# Patient Record
Sex: MALE | Race: WHITE
[De-identification: names, ages, dates, MRNs, and addresses within clinical notes are randomized per-mention and may not be internally consistent; named-entity substitution may affect disease eponyms.]

---

## 2017-06-08 ENCOUNTER — HOSPITAL ENCOUNTER (EMERGENCY)
Dept: HOSPITAL 79 - ER1 | Age: 67
LOS: 1 days | Discharge: ANOTHER HOSPITAL | End: 2017-06-09
Payer: MEDICARE

## 2017-06-08 DIAGNOSIS — K64.4: ICD-10-CM

## 2017-06-08 DIAGNOSIS — Z79.899: ICD-10-CM

## 2017-06-08 DIAGNOSIS — Z87.891: ICD-10-CM

## 2017-06-08 DIAGNOSIS — R16.1: ICD-10-CM

## 2017-06-08 DIAGNOSIS — K50.90: Primary | ICD-10-CM

## 2017-06-08 DIAGNOSIS — R33.9: ICD-10-CM

## 2017-06-08 LAB
BUN/CREATININE RATIO: 13 (ref 0–10)
HGB BLD-MCNC: 9.2 GM/DL (ref 14–17.5)
RED BLOOD COUNT: 3.85 M/UL (ref 4.2–5.5)
WHITE BLOOD COUNT: 11.1 K/UL (ref 4.5–11)

## 2018-03-21 ENCOUNTER — OFFICE VISIT (OUTPATIENT)
Dept: UROLOGY | Facility: CLINIC | Age: 68
End: 2018-03-21

## 2018-03-21 VITALS — HEIGHT: 76 IN | WEIGHT: 190 LBS | BODY MASS INDEX: 23.14 KG/M2

## 2018-03-21 DIAGNOSIS — R33.9 RETENTION, URINE: Primary | ICD-10-CM

## 2018-03-21 DIAGNOSIS — R33.8 BENIGN PROSTATIC HYPERPLASIA WITH URINARY RETENTION: ICD-10-CM

## 2018-03-21 DIAGNOSIS — N40.1 BENIGN PROSTATIC HYPERPLASIA WITH URINARY RETENTION: ICD-10-CM

## 2018-03-21 LAB — PSA SERPL-MCNC: 3.79 NG/ML (ref 0–4)

## 2018-03-21 PROCEDURE — 36415 COLL VENOUS BLD VENIPUNCTURE: CPT | Performed by: UROLOGY

## 2018-03-21 PROCEDURE — 84153 ASSAY OF PSA TOTAL: CPT | Performed by: UROLOGY

## 2018-03-21 PROCEDURE — 99214 OFFICE O/P EST MOD 30 MIN: CPT | Performed by: UROLOGY

## 2018-03-21 RX ORDER — TRAZODONE HYDROCHLORIDE 150 MG/1
TABLET ORAL
COMMUNITY
Start: 2017-12-13

## 2018-03-21 RX ORDER — PANTOPRAZOLE SODIUM 40 MG/1
40 TABLET, DELAYED RELEASE ORAL DAILY
Refills: 0 | COMMUNITY
Start: 2018-03-16

## 2018-03-21 RX ORDER — FERROUS SULFATE 325(65) MG
1 TABLET ORAL DAILY
Refills: 0 | COMMUNITY
Start: 2018-02-14

## 2018-03-21 RX ORDER — LEVOTHYROXINE SODIUM 0.1 MG/1
100 TABLET ORAL DAILY
Refills: 0 | COMMUNITY
Start: 2017-12-15

## 2018-03-21 RX ORDER — TAMSULOSIN HYDROCHLORIDE 0.4 MG/1
1 CAPSULE ORAL NIGHTLY
Qty: 30 CAPSULE | Refills: 11 | Status: SHIPPED | OUTPATIENT
Start: 2018-03-21 | End: 2018-12-19 | Stop reason: SDUPTHER

## 2018-03-21 RX ORDER — TRAMADOL HYDROCHLORIDE 50 MG/1
TABLET ORAL
Refills: 0 | COMMUNITY
Start: 2018-03-16

## 2018-03-21 RX ORDER — FINASTERIDE 5 MG/1
5 TABLET, FILM COATED ORAL DAILY
Qty: 30 TABLET | Refills: 11 | Status: SHIPPED | OUTPATIENT
Start: 2018-03-21 | End: 2018-12-19 | Stop reason: SDUPTHER

## 2018-03-21 RX ORDER — CYCLOBENZAPRINE HCL 10 MG
10 TABLET ORAL 3 TIMES DAILY PRN
Refills: 0 | COMMUNITY
Start: 2018-02-14

## 2018-03-21 NOTE — PROGRESS NOTES
Chief Complaint:          Pt has a catheter for 9 months    HPI:   68 y.o. male.    HPI  Pt wants his catheter out.  He still has sores from where his rectum was removed for his crohn's disease he had a AP resection and colostomy at [facility].  Pt used to void every 12 hours prior to his catheter.    Past Medical History:        Past Medical History:   Diagnosis Date   • Thyroid disease          Current Meds:     Current Outpatient Prescriptions   Medication Sig Dispense Refill   • cyclobenzaprine (FLEXERIL) 10 MG tablet Take 10 mg by mouth 3 (Three) Times a Day As Needed.  0   • ferrous sulfate 325 (65 FE) MG tablet Take 1 tablet by mouth Daily. with food  0   • levothyroxine (SYNTHROID, LEVOTHROID) 100 MCG tablet Take 100 mcg by mouth Daily.  0   • pantoprazole (PROTONIX) 40 MG EC tablet Take 40 mg by mouth Daily.  0   • traMADol (ULTRAM) 50 MG tablet   0   • traZODone (DESYREL) 150 MG tablet      • finasteride (PROSCAR) 5 MG tablet Take 1 tablet by mouth Daily. 30 tablet 11   • tamsulosin (FLOMAX) 0.4 MG capsule 24 hr capsule Take 1 capsule by mouth Every Night. 30 capsule 11     No current facility-administered medications for this visit.         Allergies:      No Known Allergies     Past Surgical History:     Past Surgical History:   Procedure Laterality Date   • BACK SURGERY     • COLOSTOMY     • GROIN ABSCESS INCISION AND DRAINAGE     • KNEE SURGERY Right    • RECTAL SURGERY      Rectum removed    • SHOULDER SURGERY Right          Social History:     Social History     Social History   • Marital status: Single     Spouse name: N/A   • Number of children: N/A   • Years of education: N/A     Occupational History   • Not on file.     Social History Main Topics   • Smoking status: Former Smoker   • Smokeless tobacco: Never Used   • Alcohol use No   • Drug use: No   • Sexual activity: Not on file     Other Topics Concern   • Not on file     Social History Narrative   • No narrative on file       Family History:      Family History   Problem Relation Age of Onset   • Hypertension Father    • Pancreatic cancer Mother    • Bone cancer Sister    • Esophagitis Sister    • Skin cancer Sister        Review of Systems:     Review of Systems   Constitutional: Negative for chills, fatigue and fever.   Respiratory: Negative for cough, shortness of breath and wheezing.    Cardiovascular: Negative for leg swelling.   Gastrointestinal: Negative for abdominal pain, nausea and vomiting.   Musculoskeletal: Negative for back pain and joint swelling.   Neurological: Negative for dizziness and headaches.   Psychiatric/Behavioral: Positive for confusion.       The following portions of the patient's history were reviewed and updated as appropriate:allergies, current medications, past family history, past medical history, past social history, past surgical history, problem list and ROS  Physical Exam:     Physical Exam   Constitutional: He is oriented to person, place, and time. He appears well-developed and well-nourished.   HENT:   Head: Normocephalic and atraumatic.   Right Ear: External ear normal.   Left Ear: External ear normal.   Nose: Nose normal.   Mouth/Throat: Oropharynx is clear and moist.   Eyes: Conjunctivae and EOM are normal. Pupils are equal, round, and reactive to light.   Neck: Normal range of motion. Neck supple. No thyromegaly present.   Cardiovascular: Normal rate, regular rhythm, normal heart sounds and intact distal pulses.    No murmur heard.  Pulmonary/Chest: Effort normal and breath sounds normal. No respiratory distress. He has no wheezes. He has no rales. He exhibits no tenderness.   Abdominal: Soft. Bowel sounds are normal. He exhibits no distension and no mass. There is no tenderness. No hernia.   Colostomy in place   Genitourinary: Penis normal.   Genitourinary Comments: Pt has sacral wound from rectal surgery.   Musculoskeletal: Normal range of motion. He exhibits no edema or tenderness.   Lymphadenopathy:     He  "has no cervical adenopathy.   Neurological: He is alert and oriented to person, place, and time. No cranial nerve deficit. He exhibits normal muscle tone. Coordination normal.   Pt has a right leg does not bare weight nor walk well.   Skin: Skin is warm. No rash noted.   Psychiatric: He has a normal mood and affect. His behavior is normal. Judgment and thought content normal.   Nursing note and vitals reviewed.      Ht 193 cm (76\")   Wt 86.2 kg (190 lb)   BMI 23.13 kg/m²    Procedure:         Assessment:     Encounter Diagnoses   Name Primary?   • Retention, urine Yes   • Benign prostatic hyperplasia with urinary retention      Orders Placed This Encounter   Procedures   • PSA DIAGNOSTIC     Standing Status:   Future     Standing Expiration Date:   3/21/2019       Plan:   Will do a voiding trial in 2 weeks.    Counseling was given to patient for the following topics impressions as follows: retention. The interim medical history and current results were reviewed.  A treatment plan with follow-up was made for Retention, urine [R33.9]. I spent 35 minutes face to face with Carter Guerrier and 70 percentage was spent in counseling.    This document has been electronically signed by Juan A Wilson MD March 21, 2018 3:45 PM  "

## 2018-04-04 ENCOUNTER — TELEPHONE (OUTPATIENT)
Dept: UROLOGY | Facility: CLINIC | Age: 68
End: 2018-04-04

## 2018-04-04 NOTE — TELEPHONE ENCOUNTER
Patient scheduled for voiding trial today. Patient unable to urinate since catheter was taken out this morning. Spoke with Dr. Wilson, home health nurse replacing catheter. Rescheduled patient for 3 week voiding trial.

## 2018-04-25 ENCOUNTER — OFFICE VISIT (OUTPATIENT)
Dept: UROLOGY | Facility: CLINIC | Age: 68
End: 2018-04-25

## 2018-04-25 VITALS — WEIGHT: 190 LBS | HEIGHT: 76 IN | BODY MASS INDEX: 23.14 KG/M2

## 2018-04-25 DIAGNOSIS — N31.9 NEUROGENIC BLADDER: ICD-10-CM

## 2018-04-25 DIAGNOSIS — R33.9 RETENTION, URINE: Primary | ICD-10-CM

## 2018-04-25 PROCEDURE — 99214 OFFICE O/P EST MOD 30 MIN: CPT | Performed by: UROLOGY

## 2018-04-25 NOTE — PROGRESS NOTES
Chief Complaint:          Retention    HPI:   68 y.o. male.  Pt failed his voiding trial with 1000 cc in his bladder.  HPI      Past Medical History:        Past Medical History:   Diagnosis Date   • Thyroid disease          Current Meds:     Current Outpatient Prescriptions   Medication Sig Dispense Refill   • cyclobenzaprine (FLEXERIL) 10 MG tablet Take 10 mg by mouth 3 (Three) Times a Day As Needed.  0   • ferrous sulfate 325 (65 FE) MG tablet Take 1 tablet by mouth Daily. with food  0   • finasteride (PROSCAR) 5 MG tablet Take 1 tablet by mouth Daily. 30 tablet 11   • levothyroxine (SYNTHROID, LEVOTHROID) 100 MCG tablet Take 100 mcg by mouth Daily.  0   • pantoprazole (PROTONIX) 40 MG EC tablet Take 40 mg by mouth Daily.  0   • tamsulosin (FLOMAX) 0.4 MG capsule 24 hr capsule Take 1 capsule by mouth Every Night. 30 capsule 11   • traMADol (ULTRAM) 50 MG tablet   0   • traZODone (DESYREL) 150 MG tablet        No current facility-administered medications for this visit.         Allergies:      No Known Allergies     Past Surgical History:     Past Surgical History:   Procedure Laterality Date   • BACK SURGERY     • COLOSTOMY     • GROIN ABSCESS INCISION AND DRAINAGE     • KNEE SURGERY Right    • RECTAL SURGERY      Rectum removed    • SHOULDER SURGERY Right          Social History:     Social History     Social History   • Marital status: Single     Spouse name: N/A   • Number of children: N/A   • Years of education: N/A     Occupational History   • Not on file.     Social History Main Topics   • Smoking status: Former Smoker   • Smokeless tobacco: Never Used   • Alcohol use No   • Drug use: No   • Sexual activity: Not on file     Other Topics Concern   • Not on file     Social History Narrative   • No narrative on file       Family History:     Family History   Problem Relation Age of Onset   • Hypertension Father    • Pancreatic cancer Mother    • Bone cancer Sister    • Esophagitis Sister    • Skin cancer  "Sister        Review of Systems:     Review of Systems    I have reviewed the follow portions of the patient's history and confirmed they are accurate today:  allergies, current medications, past family history, past medical history, past social history, past surgical history, problem list and ROS  Physical Exam:     Physical Exam   Constitutional: He is oriented to person, place, and time.   HENT:   Head: Normocephalic and atraumatic.   Right Ear: External ear normal.   Left Ear: External ear normal.   Nose: Nose normal.   Mouth/Throat: Oropharynx is clear and moist.   Eyes: Conjunctivae and EOM are normal. Pupils are equal, round, and reactive to light.   Neck: Normal range of motion. Neck supple. No thyromegaly present.   Cardiovascular: Normal rate, regular rhythm, normal heart sounds and intact distal pulses.    No murmur heard.  Pulmonary/Chest: Effort normal and breath sounds normal. No respiratory distress. He has no wheezes. He has no rales. He exhibits no tenderness.   Abdominal: Soft. Bowel sounds are normal. He exhibits no distension and no mass. There is no tenderness. No hernia.   Genitourinary: Penis normal.   Musculoskeletal: Normal range of motion. He exhibits no edema or tenderness.   Lymphadenopathy:     He has no cervical adenopathy.   Neurological: He is alert and oriented to person, place, and time. No cranial nerve deficit. He exhibits normal muscle tone. Coordination normal.   Skin: Skin is warm. No rash noted.   Psychiatric: He has a normal mood and affect. His behavior is normal. Judgment and thought content normal.   Nursing note and vitals reviewed.      Ht 193 cm (75.98\")   Wt 86.2 kg (190 lb)   BMI 23.14 kg/m²    Procedure:     16 fr coude hsu.    Assessment:     Encounter Diagnoses   Name Primary?   • Retention, urine Yes   • Neurogenic bladder      No orders of the defined types were placed in this encounter.      Plan:   I discussed how an ap resection can injure the nerves to " the bladder I discussed self cath vs indwelling hsu  Pt is open to self cath.  Will do cysto to define anatomy to see if self cath is reasonable    Patient's Body mass index is 23.14 kg/m². BMI is within normal parameters. No follow-up required.      Counseling was given to patient for the following topics instructions for management as follows: retention. The interim medical history and current results were reviewed.  A treatment plan with follow-up was made for Retention, urine [R33.9].  This document has been electronically signed by Juan A Wilson MD April 25, 2018 3:34 PM

## 2018-05-23 ENCOUNTER — PROCEDURE VISIT (OUTPATIENT)
Dept: UROLOGY | Facility: CLINIC | Age: 68
End: 2018-05-23

## 2018-05-23 VITALS — WEIGHT: 190 LBS | HEIGHT: 76 IN | BODY MASS INDEX: 23.14 KG/M2

## 2018-05-23 DIAGNOSIS — R53.83 FATIGUE, UNSPECIFIED TYPE: Primary | ICD-10-CM

## 2018-05-23 DIAGNOSIS — N21.0 BLADDER STONE: ICD-10-CM

## 2018-05-23 DIAGNOSIS — N31.9 NEUROGENIC BLADDER: ICD-10-CM

## 2018-05-23 PROCEDURE — 52000 CYSTOURETHROSCOPY: CPT | Performed by: UROLOGY

## 2018-05-23 NOTE — PROGRESS NOTES
Chief Complaint:          Retention hypotonic bladder    HPI:   68 y.o. male.  Pt had an AP resection and he has had a catheter for a year and has failed numerous voiding trials  HPI      Past Medical History:        Past Medical History:   Diagnosis Date   • Thyroid disease          Current Meds:     Current Outpatient Prescriptions   Medication Sig Dispense Refill   • cyclobenzaprine (FLEXERIL) 10 MG tablet Take 10 mg by mouth 3 (Three) Times a Day As Needed.  0   • ferrous sulfate 325 (65 FE) MG tablet Take 1 tablet by mouth Daily. with food  0   • finasteride (PROSCAR) 5 MG tablet Take 1 tablet by mouth Daily. 30 tablet 11   • levothyroxine (SYNTHROID, LEVOTHROID) 100 MCG tablet Take 100 mcg by mouth Daily.  0   • pantoprazole (PROTONIX) 40 MG EC tablet Take 40 mg by mouth Daily.  0   • tamsulosin (FLOMAX) 0.4 MG capsule 24 hr capsule Take 1 capsule by mouth Every Night. 30 capsule 11   • traMADol (ULTRAM) 50 MG tablet   0   • traZODone (DESYREL) 150 MG tablet        No current facility-administered medications for this visit.         Allergies:      No Known Allergies     Past Surgical History:     Past Surgical History:   Procedure Laterality Date   • BACK SURGERY     • COLOSTOMY     • GROIN ABSCESS INCISION AND DRAINAGE     • KNEE SURGERY Right    • RECTAL SURGERY      Rectum removed    • SHOULDER SURGERY Right          Social History:     Social History     Social History   • Marital status: Single     Spouse name: N/A   • Number of children: N/A   • Years of education: N/A     Occupational History   • Not on file.     Social History Main Topics   • Smoking status: Former Smoker   • Smokeless tobacco: Never Used   • Alcohol use No   • Drug use: No   • Sexual activity: Not on file     Other Topics Concern   • Not on file     Social History Narrative   • No narrative on file       Family History:     Family History   Problem Relation Age of Onset   • Hypertension Father    • Pancreatic cancer Mother    • Bone  "cancer Sister    • Esophagitis Sister    • Skin cancer Sister        Review of Systems:     Review of Systems   Constitutional: Positive for fatigue.   HENT: Negative for sinus pain.    Eyes: Negative for pain.   Respiratory: Negative for chest tightness.    Cardiovascular: Negative for chest pain.   Gastrointestinal: Negative for abdominal pain.   Endocrine: Negative for heat intolerance.   Genitourinary: Positive for difficulty urinating.   Musculoskeletal: Negative for back pain.   Skin: Negative for rash.   Allergic/Immunologic: Negative for food allergies.   Neurological: Negative for headaches.   Hematological: Does not bruise/bleed easily.   Psychiatric/Behavioral: The patient is not nervous/anxious.        I have reviewed the follow portions of the patient's history and confirmed they are accurate today:  allergies, current medications, past family history, past medical history, past social history, past surgical history, problem list and ROS  Physical Exam:     Physical Exam   Constitutional: He is oriented to person, place, and time. He appears well-developed.   HENT:   Head: Normocephalic.   Eyes: Pupils are equal, round, and reactive to light.   Neck: Normal range of motion.   Cardiovascular: Normal rate.    Pulmonary/Chest: Effort normal and breath sounds normal.   Genitourinary:   Genitourinary Comments: Traumatic hypospadias   Neurological: He is alert and oriented to person, place, and time.   Skin: Skin is warm and dry.       Ht 193 cm (75.98\")   Wt 86.2 kg (190 lb)   BMI 23.14 kg/m²    Procedure:     Procedure: Cystoscopy Male    Indication: retentions    Urinalysis Performed Today:  Negative for Infection    Informed Consent Obtained    Sterile prep performed in usual fashion    11 cc of topical lidocaine inserted urethrally for 10 minutes    Flexible cystoscope inserted and bladder examined    Findings: bladder stone traumatic hypospadius and 1 cm bladder stone. No tumors    Additional " Procedure with Cystoscopy: none      .    Assessment:     Encounter Diagnoses   Name Primary?   • Fatigue, unspecified type Yes   • Neurogenic bladder    • Bladder stone      No orders of the defined types were placed in this encounter.      Plan:   I discussed self cath    Patient's Body mass index is 23.14 kg/m². BMI is within normal parameters. No follow-up required.      Counseling was given to patient for the following topics instructions for management as follows: neurogenic bladder. The interim medical history and current results were reviewed.  A treatment plan with follow-up was made for Fatigue, unspecified type [R53.83]  This document has been electronically signed by Juan A Wilson MD May 31, 2018 7:19 AM

## 2018-06-20 ENCOUNTER — OFFICE VISIT (OUTPATIENT)
Dept: UROLOGY | Facility: CLINIC | Age: 68
End: 2018-06-20

## 2018-06-20 VITALS — WEIGHT: 190 LBS | BODY MASS INDEX: 23.14 KG/M2 | HEIGHT: 76 IN

## 2018-06-20 DIAGNOSIS — N31.9 NEUROGENIC BLADDER: Primary | ICD-10-CM

## 2018-06-20 DIAGNOSIS — R33.9 RETENTION, URINE: ICD-10-CM

## 2018-06-20 PROCEDURE — 99213 OFFICE O/P EST LOW 20 MIN: CPT | Performed by: UROLOGY

## 2018-06-20 NOTE — PROGRESS NOTES
Chief Complaint:          Neurogenic bladder from ap resection.    HPI:   68 y.o. male.  Pt doing self cath 2 to 3 times a day  HPI      Past Medical History:        Past Medical History:   Diagnosis Date   • Thyroid disease          Current Meds:     Current Outpatient Prescriptions   Medication Sig Dispense Refill   • cyclobenzaprine (FLEXERIL) 10 MG tablet Take 10 mg by mouth 3 (Three) Times a Day As Needed.  0   • ferrous sulfate 325 (65 FE) MG tablet Take 1 tablet by mouth Daily. with food  0   • finasteride (PROSCAR) 5 MG tablet Take 1 tablet by mouth Daily. 30 tablet 11   • levothyroxine (SYNTHROID, LEVOTHROID) 100 MCG tablet Take 100 mcg by mouth Daily.  0   • pantoprazole (PROTONIX) 40 MG EC tablet Take 40 mg by mouth Daily.  0   • tamsulosin (FLOMAX) 0.4 MG capsule 24 hr capsule Take 1 capsule by mouth Every Night. 30 capsule 11   • traMADol (ULTRAM) 50 MG tablet   0   • traZODone (DESYREL) 150 MG tablet        No current facility-administered medications for this visit.         Allergies:      No Known Allergies     Past Surgical History:     Past Surgical History:   Procedure Laterality Date   • BACK SURGERY     • COLOSTOMY     • GROIN ABSCESS INCISION AND DRAINAGE     • KNEE SURGERY Right    • RECTAL SURGERY      Rectum removed    • SHOULDER SURGERY Right          Social History:     Social History     Social History   • Marital status: Single     Spouse name: N/A   • Number of children: N/A   • Years of education: N/A     Occupational History   • Not on file.     Social History Main Topics   • Smoking status: Former Smoker   • Smokeless tobacco: Never Used   • Alcohol use No   • Drug use: No   • Sexual activity: Not on file     Other Topics Concern   • Not on file     Social History Narrative   • No narrative on file       Family History:     Family History   Problem Relation Age of Onset   • Hypertension Father    • Pancreatic cancer Mother    • Bone cancer Sister    • Esophagitis Sister    • Skin  cancer Sister        Review of Systems:     Review of Systems   Constitutional: Negative for fatigue.   HENT: Negative for sinus pain.    Eyes: Negative for pain.   Respiratory: Negative for chest tightness.    Cardiovascular: Negative for chest pain.   Gastrointestinal: Negative for abdominal pain.   Endocrine: Negative for heat intolerance.   Genitourinary: Positive for difficulty urinating.   Musculoskeletal: Negative for back pain.   Skin: Negative for rash.   Allergic/Immunologic: Negative for food allergies.   Neurological: Negative for headaches.           I have reviewed the follow portions of the patient's history and confirmed they are accurate today:  allergies, current medications, past family history, past medical history, past social history, past surgical history, problem list and ROS  Physical Exam:     Physical Exam   Constitutional: He is oriented to person, place, and time.   HENT:   Head: Normocephalic and atraumatic.   Right Ear: External ear normal.   Left Ear: External ear normal.   Nose: Nose normal.   Mouth/Throat: Oropharynx is clear and moist.   Eyes: Conjunctivae and EOM are normal. Pupils are equal, round, and reactive to light.   Neck: Normal range of motion. Neck supple. No thyromegaly present.   Cardiovascular: Normal rate, regular rhythm, normal heart sounds and intact distal pulses.    No murmur heard.  Pulmonary/Chest: Effort normal and breath sounds normal. No respiratory distress. He has no wheezes. He has no rales. He exhibits no tenderness.   Abdominal: Soft. Bowel sounds are normal. He exhibits no distension and no mass. There is no tenderness. No hernia.   Genitourinary: Rectum normal, prostate normal and penis normal.   Musculoskeletal: Normal range of motion. He exhibits no edema or tenderness.   Lymphadenopathy:     He has no cervical adenopathy.   Neurological: He is alert and oriented to person, place, and time. No cranial nerve deficit. He exhibits normal muscle tone.  "Coordination normal.   Skin: Skin is warm. No rash noted.   Psychiatric: He has a normal mood and affect. His behavior is normal. Judgment and thought content normal.   Nursing note and vitals reviewed.      Ht 193 cm (75.98\")   Wt 86.2 kg (190 lb)   BMI 23.14 kg/m²    Procedure:         Assessment:     Encounter Diagnoses   Name Primary?   • Neurogenic bladder Yes   • Retention, urine      No orders of the defined types were placed in this encounter.      Plan:   Patient will continue to straight cath 4 times a day and needs to use coude tip due to unable to pass straight catheter, Will see pt back in 6 months    Patient's Body mass index is 23.14 kg/m². BMI is within normal parameters. No follow-up required.      Counseling was given to patient and family for the following topics instructions for management as follows: neurogenic bladder. The interim medical history and current results were reviewed.  A treatment plan with follow-up was made for Neurogenic bladder [N31.9].  This document has been electronically signed by Juan A Wilson MD August 29, 2018 1:01 PM  "

## 2018-07-26 ENCOUNTER — TELEPHONE (OUTPATIENT)
Dept: GASTROENTEROLOGY | Facility: CLINIC | Age: 68
End: 2018-07-26

## 2018-07-26 NOTE — TELEPHONE ENCOUNTER
Patient called stating that he needs a prescription faxed to [facility] Fax number [phone] for Catheters. Thank you.

## 2018-08-29 PROBLEM — R33.9 RETENTION, URINE: Status: ACTIVE | Noted: 2018-08-29

## 2018-08-30 ENCOUNTER — DOCUMENTATION (OUTPATIENT)
Dept: UROLOGY | Facility: CLINIC | Age: 68
End: 2018-08-30

## 2018-12-19 ENCOUNTER — OFFICE VISIT (OUTPATIENT)
Dept: UROLOGY | Facility: CLINIC | Age: 68
End: 2018-12-19

## 2018-12-19 VITALS — WEIGHT: 190 LBS | HEIGHT: 75 IN | BODY MASS INDEX: 23.62 KG/M2

## 2018-12-19 DIAGNOSIS — N40.1 BENIGN PROSTATIC HYPERPLASIA WITH URINARY RETENTION: ICD-10-CM

## 2018-12-19 DIAGNOSIS — N31.9 NEUROGENIC BLADDER: Primary | ICD-10-CM

## 2018-12-19 DIAGNOSIS — R33.8 BENIGN PROSTATIC HYPERPLASIA WITH URINARY RETENTION: ICD-10-CM

## 2018-12-19 DIAGNOSIS — R33.9 RETENTION, URINE: ICD-10-CM

## 2018-12-19 PROCEDURE — 99214 OFFICE O/P EST MOD 30 MIN: CPT | Performed by: UROLOGY

## 2018-12-19 RX ORDER — TAMSULOSIN HYDROCHLORIDE 0.4 MG/1
1 CAPSULE ORAL NIGHTLY
Qty: 30 CAPSULE | Refills: 11 | Status: SHIPPED | OUTPATIENT
Start: 2018-12-19 | End: 2019-12-19

## 2018-12-19 RX ORDER — FINASTERIDE 5 MG/1
5 TABLET, FILM COATED ORAL DAILY
Qty: 30 TABLET | Refills: 11 | Status: SHIPPED | OUTPATIENT
Start: 2018-12-19 | End: 2019-12-19

## 2018-12-19 NOTE — PROGRESS NOTES
Chief Complaint:          Neurogenic bladder after AP resection for Crohn's    HPI:   68 y.o. male.  Pt has been doing self cath for 9 months 3 times a day.He has been self catheter dependent.  His AP resection was 1 1/2 years ago.  HPI      Past Medical History:        Past Medical History:   Diagnosis Date   • Thyroid disease          Current Meds:     Current Outpatient Medications   Medication Sig Dispense Refill   • cyclobenzaprine (FLEXERIL) 10 MG tablet Take 10 mg by mouth 3 (Three) Times a Day As Needed.  0   • ferrous sulfate 325 (65 FE) MG tablet Take 1 tablet by mouth Daily. with food  0   • finasteride (PROSCAR) 5 MG tablet Take 1 tablet by mouth Daily. 30 tablet 11   • levothyroxine (SYNTHROID, LEVOTHROID) 100 MCG tablet Take 100 mcg by mouth Daily.  0   • pantoprazole (PROTONIX) 40 MG EC tablet Take 40 mg by mouth Daily.  0   • tamsulosin (FLOMAX) 0.4 MG capsule 24 hr capsule Take 1 capsule by mouth Every Night. 30 capsule 11   • traMADol (ULTRAM) 50 MG tablet   0   • traZODone (DESYREL) 150 MG tablet        No current facility-administered medications for this visit.         Allergies:      No Known Allergies     Past Surgical History:     Past Surgical History:   Procedure Laterality Date   • BACK SURGERY     • COLOSTOMY     • GROIN ABSCESS INCISION AND DRAINAGE     • KNEE SURGERY Right    • RECTAL SURGERY      Rectum removed    • SHOULDER SURGERY Right          Social History:     Social History     Socioeconomic History   • Marital status: Single     Spouse name: Not on file   • Number of children: Not on file   • Years of education: Not on file   • Highest education level: Not on file   Social Needs   • Financial resource strain: Not on file   • Food insecurity - worry: Not on file   • Food insecurity - inability: Not on file   • Transportation needs - medical: Not on file   • Transportation needs - non-medical: Not on file   Occupational History   • Not on file   Tobacco Use   • Smoking status:  "Former Smoker   • Smokeless tobacco: Never Used   Substance and Sexual Activity   • Alcohol use: No   • Drug use: No   • Sexual activity: Not on file   Other Topics Concern   • Not on file   Social History Narrative   • Not on file       Family History:     Family History   Problem Relation Age of Onset   • Hypertension Father    • Pancreatic cancer Mother    • Bone cancer Sister    • Esophagitis Sister    • Skin cancer Sister        Review of Systems:     Review of Systems   Constitutional: Negative for fatigue.   HENT: Negative for sinus pressure and sinus pain.    Eyes: Negative for itching.   Respiratory: Negative for chest tightness.    Cardiovascular: Negative for chest pain.   Gastrointestinal: Negative for abdominal pain.   Genitourinary: Negative for frequency.   Musculoskeletal: Negative for back pain.   Allergic/Immunologic: Negative for food allergies.   Neurological: Negative for headaches.   Hematological: Does not bruise/bleed easily.   Psychiatric/Behavioral: The patient is not nervous/anxious.              I have reviewed the follow portions of the patient's history and confirmed they are accurate today:  allergies, current medications, past family history, past medical history, past social history, past surgical history, problem list and ROS  Physical Exam:     Physical Exam   Constitutional: He appears well-developed and well-nourished.   HENT:   Head: Normocephalic and atraumatic.   Eyes: EOM are normal. Pupils are equal, round, and reactive to light.   Cardiovascular: Normal rate.   Pulmonary/Chest: Effort normal.   Abdominal: Soft.       Ht 190.5 cm (75\")   Wt 86.2 kg (190 lb)   BMI 23.75 kg/m²    Procedure:         Assessment:     Encounter Diagnoses   Name Primary?   • Neurogenic bladder Yes   • Retention, urine      No orders of the defined types were placed in this encounter.      Plan:   Will keep pt on maximal medical therapy but if he is dependent upon self cath in a year we can stop " them.    Patient's Body mass index is 23.75 kg/m². BMI is within normal parameters. No follow-up required.      Counseling was given to patient and family for the following topics instructions for management as follows: neurogenic bladder. The interim medical history and current results were reviewed.  A treatment plan with follow-up was made for Neurogenic bladder [N31.9].   This document has been electronically signed by Juan A Wilson MD December 19, 2018 3:56 PM

## 2019-05-09 ENCOUNTER — LAB (OUTPATIENT)
Dept: LAB | Facility: HOSPITAL | Age: 69
End: 2019-05-09

## 2019-05-09 ENCOUNTER — TRANSCRIBE ORDERS (OUTPATIENT)
Dept: LAB | Facility: HOSPITAL | Age: 69
End: 2019-05-09

## 2019-05-09 DIAGNOSIS — T84.63XD INFLAMMATORY REACTION DUE TO INTERNAL FIXATION DEVICE OF SPINE, SUBSEQUENT ENCOUNTER: Primary | ICD-10-CM

## 2019-05-09 DIAGNOSIS — L03.319 CELLULITIS OF FLANK: ICD-10-CM

## 2019-05-09 DIAGNOSIS — T84.63XD INFLAMMATORY REACTION DUE TO INTERNAL FIXATION DEVICE OF SPINE, SUBSEQUENT ENCOUNTER: ICD-10-CM

## 2019-05-09 LAB
ALBUMIN SERPL-MCNC: 4.2 G/DL (ref 3.5–5.2)
ALBUMIN/GLOB SERPL: 1.2 G/DL
ALP SERPL-CCNC: 154 U/L (ref 39–117)
ALT SERPL W P-5'-P-CCNC: 27 U/L (ref 1–41)
ANION GAP SERPL CALCULATED.3IONS-SCNC: 17 MMOL/L
AST SERPL-CCNC: 21 U/L (ref 1–40)
BASOPHILS # BLD AUTO: 0.01 10*3/MM3 (ref 0–0.2)
BASOPHILS NFR BLD AUTO: 0.2 % (ref 0–1.5)
BILIRUB SERPL-MCNC: 0.3 MG/DL (ref 0.2–1.2)
BUN BLD-MCNC: 16 MG/DL (ref 8–23)
BUN/CREAT SERPL: 18.6 (ref 7–25)
CALCIUM SPEC-SCNC: 9.7 MG/DL (ref 8.6–10.5)
CHLORIDE SERPL-SCNC: 104 MMOL/L (ref 98–107)
CO2 SERPL-SCNC: 22 MMOL/L (ref 22–29)
CREAT BLD-MCNC: 0.86 MG/DL (ref 0.76–1.27)
CRP SERPL-MCNC: 2.28 MG/DL (ref 0–0.5)
DEPRECATED RDW RBC AUTO: 49.6 FL (ref 37–54)
EOSINOPHIL # BLD AUTO: 0.05 10*3/MM3 (ref 0–0.4)
EOSINOPHIL NFR BLD AUTO: 1 % (ref 0.3–6.2)
ERYTHROCYTE [DISTWIDTH] IN BLOOD BY AUTOMATED COUNT: 15.3 % (ref 12.3–15.4)
ERYTHROCYTE [SEDIMENTATION RATE] IN BLOOD: 52 MM/HR (ref 0–20)
GFR SERPL CREATININE-BSD FRML MDRD: 88 ML/MIN/1.73
GLOBULIN UR ELPH-MCNC: 3.6 GM/DL
GLUCOSE BLD-MCNC: 120 MG/DL (ref 65–99)
HCT VFR BLD AUTO: 41.3 % (ref 37.5–51)
HGB BLD-MCNC: 13.1 G/DL (ref 13–17.7)
IMM GRANULOCYTES # BLD AUTO: 0 10*3/MM3 (ref 0–0.05)
IMM GRANULOCYTES NFR BLD AUTO: 0 % (ref 0–0.5)
LYMPHOCYTES # BLD AUTO: 0.76 10*3/MM3 (ref 0.7–3.1)
LYMPHOCYTES NFR BLD AUTO: 15.6 % (ref 19.6–45.3)
MCH RBC QN AUTO: 27.9 PG (ref 26.6–33)
MCHC RBC AUTO-ENTMCNC: 31.7 G/DL (ref 31.5–35.7)
MCV RBC AUTO: 87.9 FL (ref 79–97)
MONOCYTES # BLD AUTO: 0.35 10*3/MM3 (ref 0.1–0.9)
MONOCYTES NFR BLD AUTO: 7.2 % (ref 5–12)
NEUTROPHILS # BLD AUTO: 3.7 10*3/MM3 (ref 1.7–7)
NEUTROPHILS NFR BLD AUTO: 76 % (ref 42.7–76)
PLATELET # BLD AUTO: 187 10*3/MM3 (ref 140–450)
PMV BLD AUTO: 9.9 FL (ref 6–12)
POTASSIUM BLD-SCNC: 4.1 MMOL/L (ref 3.5–5.2)
PROT SERPL-MCNC: 7.8 G/DL (ref 6–8.5)
RBC # BLD AUTO: 4.7 10*6/MM3 (ref 4.14–5.8)
SODIUM BLD-SCNC: 143 MMOL/L (ref 136–145)
WBC NRBC COR # BLD: 4.87 10*3/MM3 (ref 3.4–10.8)

## 2019-05-09 PROCEDURE — 85652 RBC SED RATE AUTOMATED: CPT

## 2019-05-09 PROCEDURE — 36415 COLL VENOUS BLD VENIPUNCTURE: CPT

## 2019-05-09 PROCEDURE — 80053 COMPREHEN METABOLIC PANEL: CPT

## 2019-05-09 PROCEDURE — 85025 COMPLETE CBC W/AUTO DIFF WBC: CPT

## 2019-05-09 PROCEDURE — 86140 C-REACTIVE PROTEIN: CPT

## 2019-05-23 ENCOUNTER — LAB (OUTPATIENT)
Dept: LAB | Facility: HOSPITAL | Age: 69
End: 2019-05-23

## 2019-05-23 ENCOUNTER — TRANSCRIBE ORDERS (OUTPATIENT)
Dept: LAB | Facility: HOSPITAL | Age: 69
End: 2019-05-23

## 2019-05-23 DIAGNOSIS — T84.63XD INFLAMMATORY REACTION DUE TO INTERNAL FIXATION DEVICE OF SPINE, SUBSEQUENT ENCOUNTER: Primary | ICD-10-CM

## 2019-05-23 DIAGNOSIS — T84.63XD INFLAMMATORY REACTION DUE TO INTERNAL FIXATION DEVICE OF SPINE, SUBSEQUENT ENCOUNTER: ICD-10-CM

## 2019-05-23 LAB
ALBUMIN SERPL-MCNC: 4.4 G/DL (ref 3.5–5.2)
ALBUMIN/GLOB SERPL: 1.3 G/DL
ALP SERPL-CCNC: 145 U/L (ref 39–117)
ALT SERPL W P-5'-P-CCNC: 30 U/L (ref 1–41)
ANION GAP SERPL CALCULATED.3IONS-SCNC: 10 MMOL/L
AST SERPL-CCNC: 21 U/L (ref 1–40)
BILIRUB SERPL-MCNC: 0.3 MG/DL (ref 0.2–1.2)
BUN BLD-MCNC: 22 MG/DL (ref 8–23)
BUN/CREAT SERPL: 23.4 (ref 7–25)
CALCIUM SPEC-SCNC: 9.5 MG/DL (ref 8.6–10.5)
CHLORIDE SERPL-SCNC: 105 MMOL/L (ref 98–107)
CO2 SERPL-SCNC: 25 MMOL/L (ref 22–29)
CREAT BLD-MCNC: 0.94 MG/DL (ref 0.76–1.27)
CRP SERPL-MCNC: 1.04 MG/DL (ref 0–0.5)
DEPRECATED RDW RBC AUTO: 48.9 FL (ref 37–54)
ERYTHROCYTE [DISTWIDTH] IN BLOOD BY AUTOMATED COUNT: 15.1 % (ref 12.3–15.4)
ERYTHROCYTE [SEDIMENTATION RATE] IN BLOOD: 33 MM/HR (ref 0–20)
GFR SERPL CREATININE-BSD FRML MDRD: 80 ML/MIN/1.73
GLOBULIN UR ELPH-MCNC: 3.3 GM/DL
GLUCOSE BLD-MCNC: 113 MG/DL (ref 65–99)
HCT VFR BLD AUTO: 41.8 % (ref 37.5–51)
HGB BLD-MCNC: 13.3 G/DL (ref 13–17.7)
MCH RBC QN AUTO: 28.2 PG (ref 26.6–33)
MCHC RBC AUTO-ENTMCNC: 31.8 G/DL (ref 31.5–35.7)
MCV RBC AUTO: 88.6 FL (ref 79–97)
PLATELET # BLD AUTO: 134 10*3/MM3 (ref 140–450)
PMV BLD AUTO: 10.5 FL (ref 6–12)
POTASSIUM BLD-SCNC: 4.3 MMOL/L (ref 3.5–5.2)
PROT SERPL-MCNC: 7.7 G/DL (ref 6–8.5)
RBC # BLD AUTO: 4.72 10*6/MM3 (ref 4.14–5.8)
SODIUM BLD-SCNC: 140 MMOL/L (ref 136–145)
WBC NRBC COR # BLD: 4.95 10*3/MM3 (ref 3.4–10.8)

## 2019-05-23 PROCEDURE — 85652 RBC SED RATE AUTOMATED: CPT

## 2019-05-23 PROCEDURE — 85027 COMPLETE CBC AUTOMATED: CPT

## 2019-05-23 PROCEDURE — 36415 COLL VENOUS BLD VENIPUNCTURE: CPT | Performed by: INTERNAL MEDICINE

## 2019-05-23 PROCEDURE — 80053 COMPREHEN METABOLIC PANEL: CPT

## 2019-05-23 PROCEDURE — 86140 C-REACTIVE PROTEIN: CPT | Performed by: INTERNAL MEDICINE

## 2019-12-19 ENCOUNTER — OFFICE VISIT (OUTPATIENT)
Dept: UROLOGY | Facility: CLINIC | Age: 69
End: 2019-12-19

## 2019-12-19 VITALS — HEIGHT: 75 IN | WEIGHT: 232 LBS | BODY MASS INDEX: 28.85 KG/M2

## 2019-12-19 DIAGNOSIS — N31.9 NEUROGENIC BLADDER: Primary | ICD-10-CM

## 2019-12-19 DIAGNOSIS — N42.9 DISORDER OF PROSTATE: ICD-10-CM

## 2019-12-19 PROCEDURE — 99213 OFFICE O/P EST LOW 20 MIN: CPT | Performed by: UROLOGY

## 2019-12-19 PROCEDURE — 84153 ASSAY OF PSA TOTAL: CPT | Performed by: UROLOGY

## 2019-12-19 NOTE — PROGRESS NOTES
Chief Complaint:          NEUROGENIC BLADDER    HPI:   69 y.o. male.  Pt has some leakage when he get when he gets up.  Pt has crohn's and he has a colostomy.  He does not have a rectum.  He is a paraplegic.  HPI  He caths 4 to 6 times a day for 300 to 600 cc.  He has not had many infections.    Past Medical History:        Past Medical History:   Diagnosis Date   • Thyroid disease          Current Meds:     Current Outpatient Medications   Medication Sig Dispense Refill   • cyclobenzaprine (FLEXERIL) 10 MG tablet Take 10 mg by mouth 3 (Three) Times a Day As Needed.  0   • ferrous sulfate 325 (65 FE) MG tablet Take 1 tablet by mouth Daily. with food  0   • levothyroxine (SYNTHROID, LEVOTHROID) 100 MCG tablet Take 100 mcg by mouth Daily.  0   • pantoprazole (PROTONIX) 40 MG EC tablet Take 40 mg by mouth Daily.  0   • traMADol (ULTRAM) 50 MG tablet   0   • traZODone (DESYREL) 150 MG tablet        No current facility-administered medications for this visit.         Allergies:      No Known Allergies     Past Surgical History:     Past Surgical History:   Procedure Laterality Date   • BACK SURGERY     • COLOSTOMY     • GROIN ABSCESS INCISION AND DRAINAGE     • KNEE SURGERY Right    • RECTAL SURGERY      Rectum removed    • SHOULDER SURGERY Right          Social History:     Social History     Socioeconomic History   • Marital status: Single     Spouse name: Not on file   • Number of children: Not on file   • Years of education: Not on file   • Highest education level: Not on file   Tobacco Use   • Smoking status: Former Smoker   • Smokeless tobacco: Never Used   Substance and Sexual Activity   • Alcohol use: No   • Drug use: No   • Sexual activity: Defer       Family History:     Family History   Problem Relation Age of Onset   • Hypertension Father    • Pancreatic cancer Mother    • Bone cancer Sister    • Esophagitis Sister    • Skin cancer Sister        Review of Systems:     Review of Systems   Constitutional:  "Negative for chills, fatigue and fever.   HENT: Negative for congestion and sinus pressure.    Respiratory: Positive for chest tightness. Negative for shortness of breath.    Cardiovascular: Negative for chest pain.   Gastrointestinal: Negative for abdominal pain, constipation, diarrhea, nausea and vomiting.   Genitourinary: Negative for flank pain, frequency, hematuria and urgency.   Musculoskeletal: Negative for back pain and neck pain.   Skin: Negative for rash.   Neurological: Negative for dizziness and headaches.   Hematological: Does not bruise/bleed easily.   Psychiatric/Behavioral: The patient is not nervous/anxious.          Physical Exam:     Physical Exam   Constitutional: He is oriented to person, place, and time.   HENT:   Head: Normocephalic and atraumatic.   Right Ear: External ear normal.   Left Ear: External ear normal.   Nose: Nose normal.   Mouth/Throat: Oropharynx is clear and moist.   Eyes: Pupils are equal, round, and reactive to light. Conjunctivae and EOM are normal.   Neck: Normal range of motion. Neck supple. No thyromegaly present.   Cardiovascular: Normal rate, regular rhythm, normal heart sounds and intact distal pulses.   No murmur heard.  Pulmonary/Chest: Effort normal and breath sounds normal. No respiratory distress. He has no wheezes. He has no rales. He exhibits no tenderness.   Abdominal: Soft. Bowel sounds are normal. He exhibits no distension and no mass. There is no tenderness. No hernia.   colostomy   Musculoskeletal: He exhibits no edema or tenderness.   Lymphadenopathy:     He has no cervical adenopathy.   Neurological: He is oriented to person, place, and time. No cranial nerve deficit. He exhibits normal muscle tone. Coordination normal.   Skin: No rash noted.   Nursing note and vitals reviewed.      Ht 190.5 cm (75\")   Wt 105 kg (232 lb)   BMI 29.00 kg/m²    Procedure:         Assessment:     Encounter Diagnoses   Name Primary?   • Neurogenic bladder Yes   • Disorder of " prostate        Orders Placed This Encounter   Procedures   • PSA DIAGNOSTIC     Standing Status:   Future     Number of Occurrences:   1     Standing Expiration Date:   12/19/2022       Patient reports that he is not currently experiencing any symptoms of urinary incontinence.      Plan:   Will check a psa today and will see him next year.    Patient's Body mass index is 29 kg/m². BMI is within normal parameters. No follow-up required..      Smoking Cessation Counseling:  Former smoker.  Patient does not currently use any tobacco products.   Quit 2 years ago    Counseling was given to patient for the following topics instructions for management as follows: neurogenic bladder. The interim medical history and current results were reviewed.  A treatment plan with follow-up was made for Neurogenic bladder [N31.9]     This document has been electronically signed by Juan A Wilson MD December 29, 2019 10:17 AM

## 2019-12-20 LAB — PSA SERPL-MCNC: 1.36 NG/ML (ref 0–4)

## 2020-11-05 ENCOUNTER — OFFICE VISIT (OUTPATIENT)
Dept: UROLOGY | Facility: CLINIC | Age: 70
End: 2020-11-05

## 2020-11-05 VITALS — WEIGHT: 235 LBS | HEIGHT: 76 IN | BODY MASS INDEX: 28.62 KG/M2 | TEMPERATURE: 98.9 F

## 2020-11-05 DIAGNOSIS — R97.20 ELEVATED PSA: ICD-10-CM

## 2020-11-05 DIAGNOSIS — N31.9 NEUROGENIC BLADDER: Primary | ICD-10-CM

## 2020-11-05 DIAGNOSIS — R33.9 RETENTION, URINE: ICD-10-CM

## 2020-11-05 PROCEDURE — 36415 COLL VENOUS BLD VENIPUNCTURE: CPT | Performed by: UROLOGY

## 2020-11-05 PROCEDURE — 99213 OFFICE O/P EST LOW 20 MIN: CPT | Performed by: UROLOGY

## 2020-11-05 PROCEDURE — 84153 ASSAY OF PSA TOTAL: CPT | Performed by: UROLOGY

## 2020-11-05 PROCEDURE — 84154 ASSAY OF PSA FREE: CPT | Performed by: UROLOGY

## 2020-11-05 NOTE — PROGRESS NOTES
Chief Complaint:          Elevated PSA    HPI:   70 y.o. male.  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  He has been doing self cath for 3 years.  His psa was 6.2.  Pt has had his rectum removed and he has a colostomy.  He had his rectum removed for chron's and he became a paraplegic 3 years ago.  His psa 2 years ago was 3.79 and now it is 6.2.  HPI      Past Medical History:        Past Medical History:   Diagnosis Date   • Thyroid disease          Current Meds:     Current Outpatient Medications   Medication Sig Dispense Refill   • cyclobenzaprine (FLEXERIL) 10 MG tablet Take 10 mg by mouth 3 (Three) Times a Day As Needed.  0   • ferrous sulfate 325 (65 FE) MG tablet Take 1 tablet by mouth Daily. with food  0   • levothyroxine (SYNTHROID, LEVOTHROID) 100 MCG tablet Take 100 mcg by mouth Daily.  0   • pantoprazole (PROTONIX) 40 MG EC tablet Take 40 mg by mouth Daily.  0   • traMADol (ULTRAM) 50 MG tablet   0   • traZODone (DESYREL) 150 MG tablet        No current facility-administered medications for this visit.         Allergies:      No Known Allergies     Past Surgical History:     Past Surgical History:   Procedure Laterality Date   • BACK SURGERY     • COLOSTOMY     • GROIN ABSCESS INCISION AND DRAINAGE     • KNEE SURGERY Right    • RECTAL SURGERY      Rectum removed    • SHOULDER SURGERY Right          Social History:     Social History     Socioeconomic History   • Marital status: Single     Spouse name: Not on file   • Number of children: Not on file   • Years of education: Not on file   • Highest education level: Not on file   Tobacco Use   • Smoking status: Former Smoker   • Smokeless tobacco: Never Used   Substance and Sexual Activity   • Alcohol use: No   • Drug use: No   • Sexual activity: Defer       Family History:     Family History   Problem Relation Age of Onset   • Hypertension Father    • Pancreatic cancer Mother    • Bone cancer Sister    •  "Esophagitis Sister    • Skin cancer Sister        Review of Systems:     Review of Systems          I have reviewed the follow portions of the patient's history and confirmed they are accurate today:  allergies, current medications, past family history, past medical history, past social history, past surgical history, problem list and ROS  Physical Exam:     Physical Exam  Vitals signs and nursing note reviewed.   HENT:      Head: Normocephalic and atraumatic.      Right Ear: External ear normal.      Left Ear: External ear normal.      Nose: Nose normal.   Eyes:      Conjunctiva/sclera: Conjunctivae normal.      Pupils: Pupils are equal, round, and reactive to light.   Neck:      Musculoskeletal: Normal range of motion and neck supple.      Thyroid: No thyromegaly.   Cardiovascular:      Rate and Rhythm: Normal rate and regular rhythm.      Heart sounds: Normal heart sounds. No murmur.   Pulmonary:      Effort: Pulmonary effort is normal. No respiratory distress.      Breath sounds: Normal breath sounds. No wheezing or rales.   Chest:      Chest wall: No tenderness.   Abdominal:      General: Bowel sounds are normal. There is no distension.      Palpations: Abdomen is soft. There is no mass.      Tenderness: There is no abdominal tenderness.      Hernia: No hernia is present.   Musculoskeletal:         General: No tenderness.   Lymphadenopathy:      Cervical: No cervical adenopathy.   Skin:     General: Skin is warm.      Findings: No rash.   Neurological:      Mental Status: He is alert and oriented to person, place, and time.      Cranial Nerves: No cranial nerve deficit.      Motor: No abnormal muscle tone.      Coordination: Coordination normal.   Psychiatric:         Behavior: Behavior normal.         Thought Content: Thought content normal.         Judgment: Judgment normal.         Temp 98.9 °F (37.2 °C)   Ht 193 cm (76\")   Wt 107 kg (235 lb)   BMI 28.61 kg/m²    Procedure:         Assessment: "     Encounter Diagnoses   Name Primary?   • Neurogenic bladder Yes   • Retention, urine    • Elevated PSA        Orders Placed This Encounter   Procedures   • PSA, Total & Free     Standing Status:   Future     Standing Expiration Date:   11/5/2021       Patient reports that he is not currently experiencing any symptoms of urinary incontinence.      Plan:   Will check a free and total psa today and will see him back in a month  Pt's total psa is 6.5 and his free psa is 13%.  Will refer pt to Dr. Catherine for evaluation and diagnosis    Patient's Body mass index is 28.61 kg/m². BMI is within normal parameters. No follow-up required..      Smoking Cessation Counseling:  Former smoker.  Patient does not currently use any tobacco products.     Counseling was given to patient and family for the following topics impressions as follows: elevated psa. The interim medical history and current results were reviewed.  A treatment plan with follow-up was made for Neurogenic bladder [N31.9].     This document has been electronically signed by Juan A Wilson MD November 5, 2020 14:06 EST

## 2020-11-07 LAB
PSA FREE MFR SERPL: 13.4 %
PSA FREE SERPL-MCNC: 0.87 NG/ML
PSA SERPL-MCNC: 6.5 NG/ML (ref 0–4)

## 2020-11-09 ENCOUNTER — TELEPHONE (OUTPATIENT)
Dept: UROLOGY | Facility: CLINIC | Age: 70
End: 2020-11-09

## 2020-11-09 NOTE — TELEPHONE ENCOUNTER
Called patient to let him know that his PSA was a 6.5 and that Dr putnam is referring him to Dr lucio moss to be treated. PAtient verbalized understanding.

## 2020-12-08 ENCOUNTER — OFFICE VISIT (OUTPATIENT)
Dept: UROLOGY | Facility: CLINIC | Age: 70
End: 2020-12-08

## 2020-12-08 VITALS — TEMPERATURE: 97.9 F | WEIGHT: 235 LBS | HEIGHT: 76 IN | BODY MASS INDEX: 28.62 KG/M2

## 2020-12-08 DIAGNOSIS — R97.20 ELEVATED PSA: Primary | ICD-10-CM

## 2020-12-08 PROCEDURE — 99213 OFFICE O/P EST LOW 20 MIN: CPT | Performed by: UROLOGY

## 2020-12-08 NOTE — PROGRESS NOTES
Chief Complaint:          Elevated psa     HPI:   70 y.o. male.  His psa is elevated at 6.5 and his free psa is 13%.  HPI  Pt has an appointment with Dr. Trevizo on 1/26 for an opinion.regarding    Past Medical History:        Past Medical History:   Diagnosis Date   • Thyroid disease          Current Meds:     Current Outpatient Medications   Medication Sig Dispense Refill   • cyclobenzaprine (FLEXERIL) 10 MG tablet Take 10 mg by mouth 3 (Three) Times a Day As Needed.  0   • ferrous sulfate 325 (65 FE) MG tablet Take 1 tablet by mouth Daily. with food  0   • levothyroxine (SYNTHROID, LEVOTHROID) 100 MCG tablet Take 100 mcg by mouth Daily.  0   • pantoprazole (PROTONIX) 40 MG EC tablet Take 40 mg by mouth Daily.  0   • traMADol (ULTRAM) 50 MG tablet   0   • traZODone (DESYREL) 150 MG tablet        No current facility-administered medications for this visit.         Allergies:      No Known Allergies     Past Surgical History:     Past Surgical History:   Procedure Laterality Date   • BACK SURGERY     • COLOSTOMY     • GROIN ABSCESS INCISION AND DRAINAGE     • KNEE SURGERY Right    • RECTAL SURGERY      Rectum removed    • SHOULDER SURGERY Right          Social History:     Social History     Socioeconomic History   • Marital status: Single     Spouse name: Not on file   • Number of children: Not on file   • Years of education: Not on file   • Highest education level: Not on file   Tobacco Use   • Smoking status: Former Smoker   • Smokeless tobacco: Never Used   Substance and Sexual Activity   • Alcohol use: No   • Drug use: No   • Sexual activity: Defer       Family History:     Family History   Problem Relation Age of Onset   • Hypertension Father    • Pancreatic cancer Mother    • Bone cancer Sister    • Esophagitis Sister    • Skin cancer Sister        Review of Systems:     Review of Systems   Constitutional: Negative for chills, fatigue and fever.   HENT: Negative for congestion and sinus pressure.   "  Respiratory: Negative for chest tightness and shortness of breath.    Cardiovascular: Negative for chest pain.   Gastrointestinal: Negative for abdominal pain, constipation, diarrhea, nausea and vomiting.   Genitourinary: Negative for flank pain, frequency, hematuria and urgency.   Musculoskeletal: Negative for back pain and neck pain.   Skin: Negative for rash.   Neurological: Negative for dizziness and headaches.   Hematological: Bruises/bleeds easily.   Psychiatric/Behavioral: The patient is not nervous/anxious.      Physical Exam:     Physical Exam    Temp 97.9 °F (36.6 °C)   Ht 193 cm (76\")   Wt 107 kg (235 lb)   BMI 28.61 kg/m²    Procedure:         Assessment:     Encounter Diagnosis   Name Primary?   • Elevated PSA Yes       No orders of the defined types were placed in this encounter.      Patient reports that he is not currently experiencing any symptoms of urinary incontinence.      Plan:   Because of his not having a rectum from chron's and his paraplegia from osteomyelitis from chron's diagnosing prostate cancer will be difficult and treatment may be even more difficult.     Patient's Body mass index is 28.61 kg/m². BMI is within normal parameters. No follow-up required..      Smoking Cessation Counseling:  Never a smoker.  Patient does not currently use any tobacco products.   Counseling was given to patient for the following topics diagnostic results including: psa and free psa. The interim medical history and current results were reviewed.  A treatment plan with follow-up was made for Elevated PSA [R97.20]. I spent 23 minutes face to face with Carter Guerrier and 80 percentage was spent in counseling.       This document has been electronically signed by Juan A Wilson MD December 8, 2020 15:03 EST      "

## 2021-05-07 ENCOUNTER — OFFICE VISIT (OUTPATIENT)
Dept: UROLOGY | Facility: CLINIC | Age: 71
End: 2021-05-07

## 2021-05-07 VITALS — BODY MASS INDEX: 28.73 KG/M2 | TEMPERATURE: 98.1 F | HEIGHT: 76 IN | WEIGHT: 235.89 LBS

## 2021-05-07 DIAGNOSIS — R33.9 RETENTION, URINE: ICD-10-CM

## 2021-05-07 DIAGNOSIS — R97.20 ELEVATED PSA: Primary | ICD-10-CM

## 2021-05-07 DIAGNOSIS — N31.9 NEUROGENIC BLADDER: ICD-10-CM

## 2021-05-07 PROCEDURE — 84153 ASSAY OF PSA TOTAL: CPT | Performed by: UROLOGY

## 2021-05-07 PROCEDURE — 99214 OFFICE O/P EST MOD 30 MIN: CPT | Performed by: UROLOGY

## 2021-05-07 PROCEDURE — 84154 ASSAY OF PSA FREE: CPT | Performed by: UROLOGY

## 2021-05-09 LAB
PSA FREE MFR SERPL: 10 %
PSA FREE SERPL-MCNC: 0.86 NG/ML
PSA SERPL-MCNC: 8.6 NG/ML (ref 0–4)

## 2021-05-10 PROBLEM — N31.9 NEUROGENIC BLADDER: Status: ACTIVE | Noted: 2021-05-10

## 2021-05-10 PROBLEM — R97.20 ELEVATED PSA: Status: ACTIVE | Noted: 2021-05-10

## 2021-11-12 ENCOUNTER — OFFICE VISIT (OUTPATIENT)
Dept: UROLOGY | Facility: CLINIC | Age: 71
End: 2021-11-12

## 2021-11-12 VITALS — HEIGHT: 76 IN | BODY MASS INDEX: 28.73 KG/M2

## 2021-11-12 DIAGNOSIS — R97.20 ELEVATED PSA: Primary | ICD-10-CM

## 2021-11-12 DIAGNOSIS — R33.9 RETENTION, URINE: ICD-10-CM

## 2021-11-12 DIAGNOSIS — N31.9 NEUROGENIC BLADDER: ICD-10-CM

## 2021-11-12 PROCEDURE — 84154 ASSAY OF PSA FREE: CPT | Performed by: UROLOGY

## 2021-11-12 PROCEDURE — 99213 OFFICE O/P EST LOW 20 MIN: CPT | Performed by: UROLOGY

## 2021-11-12 PROCEDURE — 84153 ASSAY OF PSA TOTAL: CPT | Performed by: UROLOGY

## 2021-11-12 NOTE — PROGRESS NOTES
Chief Complaint:          Chief Complaint   Patient presents with   • Elevated PSA     6 MONTH FU    • NEUROGENIC BLADDER       HPI:   71 y.o. male With a long and complicated history starts with myself seeing him years ago with an ischio rectal hidradenitis treatment he then had an abdominal APR resection for Crohn's disease with a neurogenic bladder as a consequence.  Multiple attempts with Dr. Juan A Wilson were unsuccessful to get him to urinate.  He then was found to have a PSA went from 3.9-6.  He was sent as a second opinion to Dr. Ismael Catalan at the [facility] who agrees that you cannot do a prostate examination he can only does follow him along at some point may need perineal biopsies.  He returns today he is cathing well I will get a free and total PSA I will call him with the results of follow-up with him based on that.  He returns today.  He does clean intermittent cath 4-7 times per day his PSA took a jump to 8.6 on May 7, 2021 I will repeat the free and total.  He has no rectum.  This will be very problematic biopsy.  I suspect the PSA is elevated on the basis of intermittent cath and irritation.  This will be very problematic if a biopsy needs to be performed and will probably have to go to       Past Medical History:        Past Medical History:   Diagnosis Date   • Elevated PSA 5/10/2021   • Thyroid disease          Current Meds:     Current Outpatient Medications   Medication Sig Dispense Refill   • cyclobenzaprine (FLEXERIL) 10 MG tablet Take 10 mg by mouth 3 (Three) Times a Day As Needed.  0   • ferrous sulfate 325 (65 FE) MG tablet Take 1 tablet by mouth Daily. with food  0   • levothyroxine (SYNTHROID, LEVOTHROID) 100 MCG tablet Take 100 mcg by mouth Daily.  0   • pantoprazole (PROTONIX) 40 MG EC tablet Take 40 mg by mouth Daily.  0   • traMADol (ULTRAM) 50 MG tablet   0   • traZODone (DESYREL) 150 MG tablet        No current facility-administered medications for this visit.         Allergies:      No Known Allergies     Past Surgical History:     Past Surgical History:   Procedure Laterality Date   • BACK SURGERY     • COLOSTOMY     • GROIN ABSCESS INCISION AND DRAINAGE     • KNEE SURGERY Right    • RECTAL SURGERY      Rectum removed    • SHOULDER SURGERY Right          Social History:     Social History     Socioeconomic History   • Marital status: Single   Tobacco Use   • Smoking status: Former Smoker   • Smokeless tobacco: Never Used   Substance and Sexual Activity   • Alcohol use: No   • Drug use: No   • Sexual activity: Defer       Family History:     Family History   Problem Relation Age of Onset   • Hypertension Father    • Pancreatic cancer Mother    • Bone cancer Sister    • Esophagitis Sister    • Skin cancer Sister        Review of Systems:     Review of Systems   Constitutional: Negative.    HENT: Negative.    Eyes: Negative.    Respiratory: Negative.    Cardiovascular: Negative.    Gastrointestinal: Negative.    Endocrine: Negative.    Genitourinary: Positive for difficulty urinating.   Musculoskeletal: Negative.    Allergic/Immunologic: Negative.    Neurological: Negative.    Hematological: Negative.    Psychiatric/Behavioral: Negative.        Physical Exam:     Physical Exam  Vitals and nursing note reviewed.   Constitutional:       Appearance: He is well-developed.   HENT:      Head: Normocephalic and atraumatic.   Eyes:      Conjunctiva/sclera: Conjunctivae normal.      Pupils: Pupils are equal, round, and reactive to light.   Cardiovascular:      Rate and Rhythm: Normal rate and regular rhythm.      Heart sounds: Normal heart sounds.   Pulmonary:      Effort: Pulmonary effort is normal.      Breath sounds: Normal breath sounds.   Abdominal:      General: Bowel sounds are normal.      Palpations: Abdomen is soft.   Musculoskeletal:         General: Normal range of motion.      Cervical back: Normal range of motion.   Skin:     General: Skin is warm and dry.    Neurological:      Mental Status: He is alert and oriented to person, place, and time.      Deep Tendon Reflexes: Reflexes are normal and symmetric.   Psychiatric:         Behavior: Behavior normal.         Thought Content: Thought content normal.         Judgment: Judgment normal.         I have reviewed the following portions of the patient's history: Allergies, current medications, past family history, past medical history, past social history, past surgical history, problem list, and ROS and confirm it is accurate.      Procedure:       Assessment/Plan:   Elevated prostate specific antigen-we discussed the diagnosis of elevated prostate-specific antigen.  I explained the pathophysiology of PSA.  It is a serine protease that's function in the male reproductive tract is to facilitate the liquefaction of semen.  It is for this reason the body does not want it freely floating in the serum and why typically bound tightly to albumin.  We discussed why we used both a PSA free and total to determine the need for more aggressive therapy. I discussed the normal range.  Additionally, it was in the range of 1 to 4, but more recently has been downgraded to something less than 2 or even approaching 1.  I discussed the risk of family history, particularly the fact that the average male has a 14% risk of prostate cancer and that in the face of a positive diagnosis in a father it will tablet and any other first-generation relative continued tablet insofar that a father and brother with prostate cancer will produce almost a 50% risk of prostate cancer.  I discussed the use of the temporal use of PSA as the best option for monitoring.  We also discussed the fact that an elevated PSA is an isolated event does not mean that this is prostate cancer and should not engender worry in this regard. I discussed other things that can elevate PSA including constipation, prostatitis, infection, recent intercourse etc., as well as the risks  and benefits associated with this.  Also discussed the fact that this is with a dilutional test and as a consequence of such were present produce slight variations on a single specimen.  Further discussed the risks and benefits of a prostate biopsy as well.  He does intermittent clean cath has an elevated PSA there is no way to evaluate his prostate digitally.  I would repeat the free and total if abnormal he will need a more aggressive work-up with a transperineal biopsy probably MRI fusion based                This document has been electronically signed by KELLY SANDOVAL MD November 12, 2021 14:25 EST

## 2021-11-14 LAB
PSA FREE MFR SERPL: 12.5 %
PSA FREE SERPL-MCNC: 0.86 NG/ML
PSA SERPL-MCNC: 6.9 NG/ML (ref 0–4)

## 2022-03-01 ENCOUNTER — HOSPITAL ENCOUNTER (OUTPATIENT)
Dept: HOSPITAL 79 - KOH-I | Age: 72
End: 2022-03-01
Attending: PSYCHIATRY & NEUROLOGY
Payer: MEDICARE

## 2022-03-01 DIAGNOSIS — R20.2: ICD-10-CM

## 2022-03-01 DIAGNOSIS — G56.03: ICD-10-CM

## 2022-03-01 DIAGNOSIS — M19.042: ICD-10-CM

## 2022-03-01 DIAGNOSIS — R20.0: Primary | ICD-10-CM

## 2022-03-01 DIAGNOSIS — M19.041: ICD-10-CM

## 2022-03-17 ENCOUNTER — HOSPITAL ENCOUNTER (OUTPATIENT)
Dept: HOSPITAL 79 - LBRF | Age: 72
End: 2022-03-17
Attending: NURSE PRACTITIONER
Payer: MEDICARE

## 2022-03-17 DIAGNOSIS — N39.0: Primary | ICD-10-CM

## 2022-03-21 ENCOUNTER — HOSPITAL ENCOUNTER (OUTPATIENT)
Dept: HOSPITAL 79 - CT | Age: 72
End: 2022-03-21
Attending: INTERNAL MEDICINE
Payer: MEDICARE

## 2022-03-21 DIAGNOSIS — K50.10: Primary | ICD-10-CM

## 2022-03-21 DIAGNOSIS — K76.0: ICD-10-CM

## 2022-06-03 ENCOUNTER — HOSPITAL ENCOUNTER (OUTPATIENT)
Dept: HOSPITAL 79 - EXRD | Age: 72
End: 2022-06-03
Attending: NURSE PRACTITIONER
Payer: MEDICARE

## 2022-06-03 DIAGNOSIS — E03.9: Primary | ICD-10-CM

## 2023-10-03 ENCOUNTER — OFFICE VISIT (OUTPATIENT)
Dept: ENDOCRINOLOGY | Facility: CLINIC | Age: 73
End: 2023-10-03
Payer: MEDICARE

## 2023-10-03 VITALS
SYSTOLIC BLOOD PRESSURE: 132 MMHG | BODY MASS INDEX: 29.22 KG/M2 | DIASTOLIC BLOOD PRESSURE: 84 MMHG | WEIGHT: 240 LBS | OXYGEN SATURATION: 95 % | HEIGHT: 76 IN | HEART RATE: 86 BPM

## 2023-10-03 DIAGNOSIS — E03.9 ACQUIRED HYPOTHYROIDISM: Primary | ICD-10-CM

## 2023-10-03 RX ORDER — BUSPIRONE HYDROCHLORIDE 15 MG/1
15 TABLET ORAL 3 TIMES DAILY
COMMUNITY

## 2023-10-03 RX ORDER — PAROXETINE 10 MG/1
10 TABLET, FILM COATED ORAL EVERY MORNING
COMMUNITY

## 2023-10-03 RX ORDER — BACLOFEN 10 MG/1
10 TABLET ORAL 2 TIMES DAILY
COMMUNITY

## 2023-10-03 RX ORDER — HYDROXYZINE HYDROCHLORIDE 25 MG/1
25 TABLET, FILM COATED ORAL 3 TIMES DAILY PRN
COMMUNITY

## 2023-10-03 RX ORDER — GABAPENTIN 100 MG/1
100 CAPSULE ORAL 3 TIMES DAILY
COMMUNITY

## 2023-10-03 NOTE — ASSESSMENT & PLAN NOTE
-Clinically euthyroid.  -TFT's most recent in range.  Continue with T4 125 mcg QD.  Reminded of proper administration including taking 7 pills per week on an empty stomach with no missed doses, waiting 30-60 minutes prior to other medications or food.  -Follow-up in 1 year.

## 2023-10-03 NOTE — PROGRESS NOTES
Chief Complaint   Patient presents with    Hypothyroidism        Referring Provider  No ref. provider found     HPI   Carter Guerrier is a 73 y.o. male had concerns including Hypothyroidism.   Seen as a new patient.  Hypothyroidism.    He was seeing his PCP and was not able to get his thyroid medication regulated.  He recently started seeing a new PCP and has been following with this and has been noted to be regulated since his last labs. He is currently taking T4 125 mcg QD.  He takes this regularly as directed without missing doses.  He is feeling better.  He denies any conflicting medication or biotin use.  He denies any compressive s/sx's.  Most recent labs:  09/27/2023  TSH: 2.610    States that he had an US Thyroid  last year that was normal, we do not have the records on this.    Birth state: [place]  Previous history of radiation to face/neck: none  Consuming foods high in iodine: none  Family history of thyroid complications:sisters and niece-hypothyroidism, no hx of thyroid cancer    The following portions of the patient's history were reviewed and updated as appropriate: allergies, current medications, past family history, past medical history, past social history, past surgical history, and problem list.    Past Medical History:   Diagnosis Date    Elevated PSA 5/10/2021    Thyroid disease      Past Surgical History:   Procedure Laterality Date    BACK SURGERY      COLOSTOMY      GROIN ABSCESS INCISION AND DRAINAGE      KNEE SURGERY Right     RECTAL SURGERY      Rectum removed     SHOULDER SURGERY Right       Family History   Problem Relation Age of Onset    Hypertension Father     Pancreatic cancer Mother     Bone cancer Sister     Esophagitis Sister     Skin cancer Sister       Social History     Socioeconomic History    Marital status: Single   Tobacco Use    Smoking status: Former    Smokeless tobacco: Never   Substance and Sexual Activity    Alcohol use: No    Drug use: No    Sexual activity: Defer     "  Allergies   Allergen Reactions    Vancomycin Anaphylaxis    Lexapro [Escitalopram] Itching      Current Outpatient Medications on File Prior to Visit   Medication Sig Dispense Refill    baclofen (LIORESAL) 10 MG tablet Take 1 tablet by mouth 2 (Two) Times a Day.      busPIRone (BUSPAR) 15 MG tablet Take 1 tablet by mouth 3 (Three) Times a Day.      gabapentin (NEURONTIN) 100 MG capsule Take 1 capsule by mouth 3 (Three) Times a Day.      hydrOXYzine (ATARAX) 25 MG tablet Take 1 tablet by mouth 3 (Three) Times a Day As Needed for Itching.      levothyroxine (SYNTHROID, LEVOTHROID) 125 MCG tablet Take 1 tablet by mouth Daily.  0    PARoxetine (PAXIL) 10 MG tablet Take 1 tablet by mouth Every Morning.      [DISCONTINUED] cyclobenzaprine (FLEXERIL) 10 MG tablet Take 10 mg by mouth 3 (Three) Times a Day As Needed. (Patient not taking: Reported on 10/3/2023)  0    [DISCONTINUED] ferrous sulfate 325 (65 FE) MG tablet Take 1 tablet by mouth Daily. with food (Patient not taking: Reported on 10/3/2023)  0    [DISCONTINUED] pantoprazole (PROTONIX) 40 MG EC tablet Take 40 mg by mouth Daily. (Patient not taking: Reported on 10/3/2023)  0    [DISCONTINUED] traMADol (ULTRAM) 50 MG tablet  (Patient not taking: Reported on 10/3/2023)  0    [DISCONTINUED] traZODone (DESYREL) 150 MG tablet  (Patient not taking: Reported on 10/3/2023)       No current facility-administered medications on file prior to visit.      Review of Systems   Constitutional:  Positive for fatigue. Negative for unexpected weight gain and unexpected weight loss.   Eyes:         Cataracts   Endocrine: Negative for cold intolerance and heat intolerance.   Skin:  Positive for dry skin.        Hair loss   Psychiatric/Behavioral:  Positive for sleep disturbance.    All other systems reviewed and are negative.     /84 (BP Location: Left arm, Patient Position: Sitting, Cuff Size: Adult)   Pulse 86   Ht 193 cm (76\")   Wt 109 kg (240 lb)   SpO2 95%   BMI 29.21 " kg/m²      Physical Exam  Vitals reviewed.   Constitutional:       Appearance: Normal appearance.      Comments: Uses a motorized wheelchair.   Eyes:      Extraocular Movements: Extraocular movements intact.   Neck:      Thyroid: No thyroid mass, thyromegaly or thyroid tenderness.   Cardiovascular:      Rate and Rhythm: Normal rate.   Pulmonary:      Effort: Pulmonary effort is normal.   Skin:     General: Skin is warm.   Neurological:      General: No focal deficit present.      Mental Status: He is alert and oriented to person, place, and time.   Psychiatric:         Mood and Affect: Mood normal.         Behavior: Behavior normal.         Thought Content: Thought content normal.         Judgment: Judgment normal.     Labs/Imaging  CMP  Lab Results   Component Value Date    GLUCOSE 113 (H) 05/23/2019    BUN 22 05/23/2019    CREATININE 0.94 05/23/2019    EGFRIFNONA 80 05/23/2019    BCR 23.4 05/23/2019    K 4.3 05/23/2019    CO2 25.0 05/23/2019    CALCIUM 9.5 05/23/2019    ALBUMIN 4.40 05/23/2019    AST 21 05/23/2019    ALT 30 05/23/2019        CBC w/DIFF   Lab Results   Component Value Date    WBC 4.95 05/23/2019    RBC 4.72 05/23/2019    HGB 13.3 05/23/2019    HCT 41.8 05/23/2019    MCV 88.6 05/23/2019    MCH 28.2 05/23/2019    MCHC 31.8 05/23/2019    RDW 15.1 05/23/2019    RDWSD 48.9 05/23/2019    MPV 10.5 05/23/2019     (L) 05/23/2019    NEUTRORELPCT 76.0 05/09/2019    LYMPHORELPCT 15.6 (L) 05/09/2019    MONORELPCT 7.2 05/09/2019    EOSRELPCT 1.0 05/09/2019    BASORELPCT 0.2 05/09/2019    AUTOIGPER 0.0 05/09/2019    NEUTROABS 3.70 05/09/2019    LYMPHSABS 0.76 05/09/2019    MONOSABS 0.35 05/09/2019    EOSABS 0.05 05/09/2019    BASOSABS 0.01 05/09/2019    AUTOIGNUM 0.00 05/09/2019       TSH  No results found for: TSH    T4  No results found for: FREET4  No results found for: G4LKNQX    T3  No results found for: T3FREE  No results found for: O2XMXMH    TRAb  No results found for: TSURCPAB    TPO  No results  found for: THYROIDAB    No valid procedures specified.    Assessment and Plan    Diagnoses and all orders for this visit:    1. Acquired hypothyroidism (Primary)  Assessment & Plan:  -Clinically euthyroid.  -TFT's most recent in range.  Continue with T4 125 mcg QD.  Reminded of proper administration including taking 7 pills per week on an empty stomach with no missed doses, waiting 30-60 minutes prior to other medications or food.  -Follow-up in 1 year.           Return in about 1 year (around 10/3/2024) for Follow-up appointment. The patient was instructed to contact the clinic with any interval questions or concerns.      This document has been electronically signed by NARCISA Rubalcava  October 3, 2023 11:16 EDT   Endocrinology    Please note that portions of this document were completed with a voice recognition program. Efforts were made to edit the dictations, but occasionally words are mis-transcribed.

## 2024-10-03 ENCOUNTER — OFFICE VISIT (OUTPATIENT)
Dept: ENDOCRINOLOGY | Facility: CLINIC | Age: 74
End: 2024-10-03
Payer: MEDICARE

## 2024-10-03 VITALS
WEIGHT: 240 LBS | BODY MASS INDEX: 29.21 KG/M2 | SYSTOLIC BLOOD PRESSURE: 106 MMHG | OXYGEN SATURATION: 96 % | DIASTOLIC BLOOD PRESSURE: 73 MMHG | HEART RATE: 66 BPM

## 2024-10-03 DIAGNOSIS — E03.9 ACQUIRED HYPOTHYROIDISM: Primary | ICD-10-CM

## 2024-10-03 RX ORDER — CHLORAL HYDRATE 500 MG
1000 CAPSULE ORAL
COMMUNITY

## 2024-10-03 RX ORDER — MULTIPLE VITAMINS W/ MINERALS TAB 9MG-400MCG
1 TAB ORAL DAILY
COMMUNITY

## 2024-10-03 RX ORDER — MAGNESIUM GLUCONATE 27 MG(500)
500 TABLET ORAL DAILY
COMMUNITY

## 2024-10-03 NOTE — ASSESSMENT & PLAN NOTE
-Clinically euthyroid.  -TFT's most recent in range.  Continue with T4 125 mcg QD. Will obtain labs to review and will make additional changes if needed. Reminded of proper administration including taking 7 pills per week on an empty stomach with no missed doses, waiting 30-60 minutes prior to other medications or food.  -Follow-up in 1 year.

## 2024-10-03 NOTE — PROGRESS NOTES
Chief Complaint   Patient presents with    Follow-up        Referring Provider  No ref. provider found     HPI   Carter Guerrier is a 74 y.o. male had concerns including Follow-up.   Hypothyroidism.    He was taking 112 mcg QD and had labs at PCP and was noted to be in normal range.  Family thinks that he is taking 112 mcg and patient thinks that he is taking 125 mcg QD.  He denies any changes to his neck or compressive symptoms.  He is overall doing well.    Thyroid:  He was seeing his PCP and was not able to get his thyroid medication regulated.  He recently started seeing a new PCP and has been following with this and has been noted to be regulated since his last labs. He is currently taking T4 125 mcg QD.  He takes this regularly as directed without missing doses.  He is feeling better.  He denies any conflicting medication or biotin use.  He denies any compressive s/sx's.  Most recent labs:  09/27/2023  TSH: 2.610    States that he had an US Thyroid  last year that was normal, we do not have the records on this.    Birth state: [place]  Previous history of radiation to face/neck: none  Consuming foods high in iodine: none  Family history of thyroid complications:sisters and niece-hypothyroidism, no hx of thyroid cancer    The following portions of the patient's history were reviewed and updated as appropriate: allergies, current medications, past family history, past medical history, past social history, past surgical history, and problem list.    Past Medical History:   Diagnosis Date    Elevated PSA 5/10/2021    Thyroid disease      Past Surgical History:   Procedure Laterality Date    BACK SURGERY      COLOSTOMY      GROIN ABSCESS INCISION AND DRAINAGE      KNEE SURGERY Right     RECTAL SURGERY      Rectum removed     SHOULDER SURGERY Right       Family History   Problem Relation Age of Onset    Hypertension Father     Pancreatic cancer Mother     Bone cancer Sister     Esophagitis Sister     Skin cancer Sister        Social History     Socioeconomic History    Marital status: Single   Tobacco Use    Smoking status: Former    Smokeless tobacco: Never   Substance and Sexual Activity    Alcohol use: No    Drug use: No    Sexual activity: Defer      Allergies   Allergen Reactions    Vancomycin Anaphylaxis    Lexapro [Escitalopram] Itching      Current Outpatient Medications on File Prior to Visit   Medication Sig Dispense Refill    baclofen (LIORESAL) 10 MG tablet Take 1 tablet by mouth 2 (Two) Times a Day.      busPIRone (BUSPAR) 15 MG tablet Take 1 tablet by mouth 3 (Three) Times a Day.      empagliflozin (JARDIANCE) 25 MG tablet tablet Take 1 tablet by mouth Daily.      gabapentin (NEURONTIN) 100 MG capsule Take 1 capsule by mouth 3 (Three) Times a Day.      levothyroxine (SYNTHROID, LEVOTHROID) 125 MCG tablet Take 112 mcg by mouth Daily.  0    PARoxetine (PAXIL) 10 MG tablet Take 1 tablet by mouth Every Morning.      hydrOXYzine (ATARAX) 25 MG tablet Take 1 tablet by mouth 3 (Three) Times a Day As Needed for Itching. (Patient not taking: Reported on 10/3/2024)      magnesium gluconate (MAGONATE) 500 MG tablet Take 1 tablet by mouth Daily.      multivitamin with minerals tablet tablet Take 1 tablet by mouth Daily.      Omega-3 Fatty Acids (fish oil) 1000 MG capsule capsule Take 1 capsule by mouth Daily With Breakfast.       No current facility-administered medications on file prior to visit.      Review of Systems   Constitutional:  Positive for fatigue. Negative for unexpected weight gain and unexpected weight loss.   Eyes:         Cataracts   Endocrine: Negative for cold intolerance and heat intolerance.   Skin:  Positive for dry skin.        Hair loss   Psychiatric/Behavioral:  Positive for sleep disturbance.    All other systems reviewed and are negative.       /73 (BP Location: Left arm, Patient Position: Sitting, Cuff Size: Adult)   Pulse 66   Wt 109 kg (240 lb)   SpO2 96%   BMI 29.21 kg/m²      Physical  "Exam  Vitals reviewed.   Constitutional:       Appearance: Normal appearance.      Comments: Uses a motorized wheelchair.   Eyes:      Extraocular Movements: Extraocular movements intact.   Neck:      Thyroid: No thyroid mass, thyromegaly or thyroid tenderness.   Cardiovascular:      Rate and Rhythm: Normal rate.   Pulmonary:      Effort: Pulmonary effort is normal.   Skin:     General: Skin is warm.   Neurological:      General: No focal deficit present.      Mental Status: He is alert and oriented to person, place, and time.   Psychiatric:         Mood and Affect: Mood normal.         Behavior: Behavior normal.         Thought Content: Thought content normal.         Judgment: Judgment normal.       Labs/Imaging  CMP  Lab Results   Component Value Date    GLUCOSE 113 (H) 05/23/2019    BUN 22 05/23/2019    CREATININE 0.94 05/23/2019    EGFRIFNONA 80 05/23/2019    BCR 23.4 05/23/2019    K 4.3 05/23/2019    CO2 25.0 05/23/2019    CALCIUM 9.5 05/23/2019    ALBUMIN 4.40 05/23/2019    AST 21 05/23/2019    ALT 30 05/23/2019        CBC w/DIFF   Lab Results   Component Value Date    WBC 4.95 05/23/2019    RBC 4.72 05/23/2019    HGB 13.3 05/23/2019    HCT 41.8 05/23/2019    MCV 88.6 05/23/2019    MCH 28.2 05/23/2019    MCHC 31.8 05/23/2019    RDW 15.1 05/23/2019    RDWSD 48.9 05/23/2019    MPV 10.5 05/23/2019     (L) 05/23/2019    NEUTRORELPCT 76.0 05/09/2019    LYMPHORELPCT 15.6 (L) 05/09/2019    MONORELPCT 7.2 05/09/2019    EOSRELPCT 1.0 05/09/2019    BASORELPCT 0.2 05/09/2019    AUTOIGPER 0.0 05/09/2019    NEUTROABS 3.70 05/09/2019    LYMPHSABS 0.76 05/09/2019    MONOSABS 0.35 05/09/2019    EOSABS 0.05 05/09/2019    BASOSABS 0.01 05/09/2019    AUTOIGNUM 0.00 05/09/2019       TSH  No results found for: \"TSH\"    T4  No results found for: \"FREET4\"  No results found for: \"Y1RNHOD\"    T3  No results found for: \"T3FREE\"  No results found for: \"H1WGSBN\"    TRAb  No results found for: \"TSURCPAB\"    TPO  No results found " "for: \"THYROIDAB\"    No valid procedures specified.    Assessment and Plan    Diagnoses and all orders for this visit:    1. Acquired hypothyroidism (Primary)  Assessment & Plan:  -Clinically euthyroid.  -TFT's most recent in range.  Continue with T4 125 mcg QD. Will obtain labs to review and will make additional changes if needed. Reminded of proper administration including taking 7 pills per week on an empty stomach with no missed doses, waiting 30-60 minutes prior to other medications or food.  -Follow-up in 1 year.             Return in about 1 year (around 10/3/2025) for Follow-up appointment. The patient was instructed to contact the clinic with any interval questions or concerns.      This document has been electronically signed by NARCISA Rubalcava  October 3, 2024 09:48 EDT   Endocrinology    Please note that portions of this document were completed with a voice recognition program. Efforts were made to edit the dictations, but occasionally words are mis-transcribed.   "